# Patient Record
Sex: FEMALE | URBAN - METROPOLITAN AREA
[De-identification: names, ages, dates, MRNs, and addresses within clinical notes are randomized per-mention and may not be internally consistent; named-entity substitution may affect disease eponyms.]

---

## 2024-02-29 ENCOUNTER — APPOINTMENT (RX ONLY)
Dept: URBAN - METROPOLITAN AREA CLINIC 184 | Facility: CLINIC | Age: 54
Setting detail: DERMATOLOGY
End: 2024-02-29

## 2024-02-29 DIAGNOSIS — M7120 SYNOVIAL CYST OF POPLITEAL SPACE: ICD-10-CM

## 2024-02-29 DIAGNOSIS — D18.0 HEMANGIOMA: ICD-10-CM

## 2024-02-29 DIAGNOSIS — Z85.828 PERSONAL HISTORY OF OTHER MALIGNANT NEOPLASM OF SKIN: ICD-10-CM

## 2024-02-29 DIAGNOSIS — D22 MELANOCYTIC NEVI: ICD-10-CM

## 2024-02-29 DIAGNOSIS — L57.8 OTHER SKIN CHANGES DUE TO CHRONIC EXPOSURE TO NONIONIZING RADIATION: ICD-10-CM

## 2024-02-29 DIAGNOSIS — L82.1 OTHER SEBORRHEIC KERATOSIS: ICD-10-CM

## 2024-02-29 PROBLEM — M71.341 OTHER BURSAL CYST, RIGHT HAND: Status: ACTIVE | Noted: 2024-02-29

## 2024-02-29 PROBLEM — D23.9 OTHER BENIGN NEOPLASM OF SKIN, UNSPECIFIED: Status: ACTIVE | Noted: 2024-02-29

## 2024-02-29 PROBLEM — D22.62 MELANOCYTIC NEVI OF LEFT UPPER LIMB, INCLUDING SHOULDER: Status: ACTIVE | Noted: 2024-02-29

## 2024-02-29 PROBLEM — D18.01 HEMANGIOMA OF SKIN AND SUBCUTANEOUS TISSUE: Status: ACTIVE | Noted: 2024-02-29

## 2024-02-29 PROCEDURE — ? COUNSELING

## 2024-02-29 PROCEDURE — 99203 OFFICE O/P NEW LOW 30 MIN: CPT

## 2024-02-29 PROCEDURE — ? SUNSCREEN RECOMMENDATIONS

## 2024-02-29 PROCEDURE — ? OBSERVATION AND MEASURE

## 2024-02-29 ASSESSMENT — LOCATION DETAILED DESCRIPTION DERM
LOCATION DETAILED: RIGHT DISTAL DORSAL SMALL FINGER
LOCATION DETAILED: LEFT POSTERIOR SHOULDER

## 2024-02-29 ASSESSMENT — LOCATION ZONE DERM
LOCATION ZONE: ARM
LOCATION ZONE: FINGER

## 2024-02-29 ASSESSMENT — LOCATION SIMPLE DESCRIPTION DERM
LOCATION SIMPLE: LEFT SHOULDER
LOCATION SIMPLE: RIGHT SMALL FINGER

## 2024-02-29 NOTE — PROCEDURE: MIPS QUALITY
Detail Level: Detailed
Quality 134: Screening For Clinical Depression And Follow-Up Plan: Depression Screening not documented, reason not given
Quality 226: Preventive Care And Screening: Tobacco Use: Screening And Cessation Intervention: Patient screened for tobacco use and is an ex/non-smoker
Quality 47: Advance Care Plan: Advance care planning not documented, reason not otherwise specified.
Quality 130: Documentation Of Current Medications In The Medical Record: Current Medications Documented
Quality 431: Preventive Care And Screening: Unhealthy Alcohol Use - Screening: Patient not identified as an unhealthy alcohol user when screened for unhealthy alcohol use using a systematic screening method

## 2024-09-10 ENCOUNTER — APPOINTMENT (RX ONLY)
Dept: URBAN - METROPOLITAN AREA CLINIC 184 | Facility: CLINIC | Age: 54
Setting detail: DERMATOLOGY
End: 2024-09-10

## 2024-09-10 DIAGNOSIS — L82.1 OTHER SEBORRHEIC KERATOSIS: ICD-10-CM

## 2024-09-10 DIAGNOSIS — L81.4 OTHER MELANIN HYPERPIGMENTATION: ICD-10-CM

## 2024-09-10 DIAGNOSIS — D22 MELANOCYTIC NEVI: ICD-10-CM

## 2024-09-10 PROBLEM — D22.62 MELANOCYTIC NEVI OF LEFT UPPER LIMB, INCLUDING SHOULDER: Status: ACTIVE | Noted: 2024-09-10

## 2024-09-10 PROBLEM — D23.9 OTHER BENIGN NEOPLASM OF SKIN, UNSPECIFIED: Status: ACTIVE | Noted: 2024-09-10

## 2024-09-10 PROCEDURE — ? PRESCRIPTION MEDICATION MANAGEMENT

## 2024-09-10 PROCEDURE — ? COUNSELING

## 2024-09-10 PROCEDURE — ? SUNSCREEN RECOMMENDATIONS

## 2024-09-10 PROCEDURE — ? PRESCRIPTION

## 2024-09-10 PROCEDURE — ? OBSERVATION AND MEASURE

## 2024-09-10 PROCEDURE — 99214 OFFICE O/P EST MOD 30 MIN: CPT

## 2024-09-10 RX ORDER — FLUOCINOLONE ACETONIDE, HYDROQUINONE, AND TRETINOIN .1; 40; .5 MG/G; MG/G; MG/G
CREAM TOPICAL QHS
Qty: 30 | Refills: 3 | Status: ERX | COMMUNITY
Start: 2024-09-10

## 2024-09-10 RX ADMIN — FLUOCINOLONE ACETONIDE, HYDROQUINONE, AND TRETINOIN: .1; 40; .5 CREAM TOPICAL at 00:00

## 2024-09-10 ASSESSMENT — LOCATION SIMPLE DESCRIPTION DERM: LOCATION SIMPLE: LEFT POSTERIOR UPPER ARM

## 2024-09-10 ASSESSMENT — LOCATION ZONE DERM: LOCATION ZONE: ARM

## 2024-09-10 ASSESSMENT — LOCATION DETAILED DESCRIPTION DERM: LOCATION DETAILED: LEFT PROXIMAL POSTERIOR UPPER ARM

## 2024-09-10 NOTE — PROCEDURE: PRESCRIPTION MEDICATION MANAGEMENT
Render In Strict Bullet Format?: No
Initiate Treatment: Tri-Gisela 0.01 %-4 %-0.05 % topical cream QHS
Detail Level: Zone

## 2025-03-11 ENCOUNTER — APPOINTMENT (OUTPATIENT)
Dept: URBAN - METROPOLITAN AREA CLINIC 184 | Facility: CLINIC | Age: 55
Setting detail: DERMATOLOGY
End: 2025-03-11

## 2025-03-11 DIAGNOSIS — D22 MELANOCYTIC NEVI: ICD-10-CM

## 2025-03-11 DIAGNOSIS — L57.0 ACTINIC KERATOSIS: ICD-10-CM

## 2025-03-11 DIAGNOSIS — D18.0 HEMANGIOMA: ICD-10-CM

## 2025-03-11 PROBLEM — D18.01 HEMANGIOMA OF SKIN AND SUBCUTANEOUS TISSUE: Status: ACTIVE | Noted: 2025-03-11

## 2025-03-11 PROBLEM — D23.9 OTHER BENIGN NEOPLASM OF SKIN, UNSPECIFIED: Status: ACTIVE | Noted: 2025-03-11

## 2025-03-11 PROBLEM — D22.62 MELANOCYTIC NEVI OF LEFT UPPER LIMB, INCLUDING SHOULDER: Status: ACTIVE | Noted: 2025-03-11

## 2025-03-11 PROBLEM — D22.5 MELANOCYTIC NEVI OF TRUNK: Status: ACTIVE | Noted: 2025-03-11

## 2025-03-11 PROCEDURE — ? PRESCRIPTION

## 2025-03-11 PROCEDURE — 99214 OFFICE O/P EST MOD 30 MIN: CPT

## 2025-03-11 PROCEDURE — ? PRESCRIPTION MEDICATION MANAGEMENT

## 2025-03-11 PROCEDURE — ? COUNSELING

## 2025-03-11 PROCEDURE — ? OBSERVATION

## 2025-03-11 PROCEDURE — ? SUNSCREEN RECOMMENDATIONS

## 2025-03-11 RX ORDER — TIRBANIBULIN 10 MG/G
OINTMENT TOPICAL
Qty: 5 | Refills: 1 | Status: ERX | COMMUNITY
Start: 2025-03-11

## 2025-03-11 RX ADMIN — TIRBANIBULIN: 10 OINTMENT TOPICAL at 00:00

## 2025-03-11 ASSESSMENT — LOCATION SIMPLE DESCRIPTION DERM
LOCATION SIMPLE: LEFT LOWER BACK
LOCATION SIMPLE: RIGHT EYEBROW
LOCATION SIMPLE: LEFT POSTERIOR UPPER ARM

## 2025-03-11 ASSESSMENT — LOCATION ZONE DERM
LOCATION ZONE: TRUNK
LOCATION ZONE: FACE
LOCATION ZONE: ARM

## 2025-03-11 ASSESSMENT — LOCATION DETAILED DESCRIPTION DERM
LOCATION DETAILED: LEFT SUPERIOR MEDIAL MIDBACK
LOCATION DETAILED: LEFT PROXIMAL POSTERIOR UPPER ARM
LOCATION DETAILED: RIGHT MEDIAL EYEBROW

## 2025-03-11 NOTE — PROCEDURE: PRESCRIPTION MEDICATION MANAGEMENT
Initiate Treatment: Klisyri 1 % topical ointment in packet (null)
Detail Level: Zone
Render In Strict Bullet Format?: No

## 2025-03-11 NOTE — PROCEDURE: OBSERVATION
Detail Level: Detailed
Size Of Lesion: 2mm
Morphology Per Location (Optional): Light brown
Size Of Lesion: 4mm